# Patient Record
Sex: FEMALE | NOT HISPANIC OR LATINO | Employment: FULL TIME | URBAN - METROPOLITAN AREA
[De-identification: names, ages, dates, MRNs, and addresses within clinical notes are randomized per-mention and may not be internally consistent; named-entity substitution may affect disease eponyms.]

---

## 2022-09-22 ENCOUNTER — OFFICE VISIT (OUTPATIENT)
Dept: PODIATRY | Facility: CLINIC | Age: 47
End: 2022-09-22
Payer: COMMERCIAL

## 2022-09-22 VITALS
DIASTOLIC BLOOD PRESSURE: 76 MMHG | HEIGHT: 61 IN | BODY MASS INDEX: 27.96 KG/M2 | WEIGHT: 148.1 LBS | SYSTOLIC BLOOD PRESSURE: 118 MMHG

## 2022-09-22 DIAGNOSIS — M79.671 RIGHT FOOT PAIN: Primary | ICD-10-CM

## 2022-09-22 DIAGNOSIS — M72.2 PLANTAR FASCIITIS, RIGHT: ICD-10-CM

## 2022-09-22 PROCEDURE — 99203 OFFICE O/P NEW LOW 30 MIN: CPT | Performed by: PODIATRIST

## 2022-09-22 RX ORDER — BUPROPION HYDROCHLORIDE 100 MG/1
200 TABLET, EXTENDED RELEASE ORAL
COMMUNITY
Start: 2022-08-09

## 2022-09-22 NOTE — PATIENT INSTRUCTIONS
Thank you for choosing [facility] / Foot & Ankle Surgery!    DR [facility] CLIN[facility]:  [facility]   [address]   [place] [address]      TRIAGE LINE: [phone]  APPOINTMENTS: [phone]  RADIOLOGY: [phone]  SET UP SURGERY: [phone]  FAX NUMBER: [phone]  BILLING QUESTIONS: [phone]       Follow up: as needed.       Recommend over-the-counter super feet-green inserts.    Call below to set up casting for custom inserts.    [facility]  [facility]  [address]  [place] [address]  Phone: [phone]  Fax: [phone] [facility]  [address]  [place] [address]  Phone: [phone]   Fax: [phone]   [facility]  [address]  [place] [address]  Phone: [phone]  Fax: [phone] [facility]  [address]  [place] [address]  Phone: [phone]   Fax: [phone]   [facility]   [address]  [place] [address]  Phone: [phone]  Fax: [phone] * Please call any location listed to make an appointment for a casting/fitting. Your referral was sent to their central office and they will all have the order on file.     PLANTAR FASCIITIS  Plantar fasciitis is often referred to as heel spurs or heel pain. Plantar fasciitis is a very common problem that affects people of all foot shapes, age, weight and activity level. Pain may be in the arch or on the weight-bearing surface of the heel. The pain may come on without injury or identifiable cause. Pain is generally present when first getting out of bed in the morning or up from a seated break.     CAUSES  The plantar fascia is a dense fibrous band of tissue that stretches across the bottom surface of the foot. The fascia helps support the foot muscles and arch. Plantar fasciitis is thought to be caused by  mechanical strain or overload. Frequent walking without shoes or wearing unsupportive shoes is thought to cause structural overload and ultimately inflammation of the plantar fascia. Some people have heel spurs that can be seen on x-ray. The heel spur is actually a minor component of plantar fascitis and is largely ignored.       SELF TREATMENT   The easiest solution is to stop walking around your home without shoes. Plantar fasciitis is largely a shoe problem. Shoes are either not being worn often enough or your current shoes are inadequate for your weight, foot structure or activity level. The majority of shoes on the market today are not sufficient to resist development of plantar fasciitis or to promote healing. Assume that your current shoes are inadequate and will need to be replaced. Even high quality shoes wear out with 6 months to one year of frequent use. Weight loss is another option. Losing ten pounds in the next two months may be enough to resolve the problem. Ice applied to the area of pain two to three times per day for ten minutes each session can be very helpful. Warm foot soaks in epsom salts can also relieve pain. This should continue until the problem resolves. Achilles tendon stretching is essential. Stretch multiple times daily to promote healing and to prevent recurrence in the future. Over all stretching of the body is helpful as well such as the calves, thighs and lower back. Normally when one area of the body is tight, other areas are too. Gentle Yoga can be good for this.     Over the counter topical anti inflammatories can be helpful such as biofreeze, bengay, salon pas, ect...  Oral ibuprofen or aleve is recommended as well to try to calm down inflammation.     Night splints can be helpful to gradually stretch the foot at night as a lot of pain is when you get up in the morning. Taking a towel or thera band and stretching the foot back multiple times before you get ou of bed can be  beneficial as well.     MEDICAL TREATMENT  Medical treatments often include custom arch supports, cortisone injections, physical therapy, splints to be worn in bed, prescription medications and surgery. The home treatments listed above will be necessary regardless of these advanced medical treatments. Surgery is rarely needed but is very helpful in selected cases.     PROGNOSIS  Plantar fasciitis can last from one day to a lifetime. Some people get intermittent fascitis that is very short-lived. Others suffer daily for years. Excessive body weight, frequent bare foot walking, long hours on the feet, inadequate shoes, predisposing foot structures and excessive activity such as running are all potential issues that lead to chronic and/or recurring plantar fascitis. Having plantar fasciitis means that you are forever prone to this problem and will require modification of some of the above factors. Most people seek treatment within one to four months. Healing usually requires a similar one to four month time frame. Healing time is relative to the amount of effort spent treating the problem.   Plantar fasciitis is highly recurrent. Risk factors often continue, including return to bare foot walking, inadequate shoes, excessive body weight, excessive activities, etc. Your life style and foot structure may predispose you to recurrent plantar fasciitis. A daily prevention regimen can be very helpful. Ongoing use of shoe inserts, careful attention to appropriate shoes, daily Achilles stretching, etc. may prevent recurrence. Prompt attention at the earliest warning signs of heel pain can resolve the problem in as short as a few days.     EXERCISES  Stair Exercise: Step on the stairs with the ball of your foot and hold your position for at least 15 seconds, then slowly step down with the heels of your foot. You can do this daily and as often as you want.   Picking the Towel: Sit comfortably and then pick the towel up with your  toes. You can use any object other than a towel as long as the material can be soft and you can pick it up with your toes.  Rolling the Bottle: Use a small ball or frozen water bottle and then roll it around with your foot.   Flex the Toes: Sit comfortably and then flex your toes by pointing it towards the floor or towards your body. This will relax and flex your foot and exercise your plantar fascia, the calf, and the Achilles tendon. The inability of the foot to stretch often causes the bunching up of the plantar fascia area leading to the pain.  Calf/Achilles Stretching: Lay on you back and raise one foot, then point your toes towards the floor. See photo below:               Hold each stretch for 10 seconds. Stretch 10 times per set, three sets per day. Morning, afternoon and evening. If your heel pain is very severe in the morning, consider doing the first set of stretches before you get out of bed.      OVER THE COUNTER INSERT RECOMMENDATIONS  SuperFeet   Sofsole Fit Spenco   Power Step   Walk-Fit Arch Cradles     Most of these can be found at your local [employer], sporting [employer], or online.  **A good high quality over the counter insert should cost around $40-$50      [facility] LOCATIONS  [facility]  [address] [place] [address]  [phone]   [address]  [phone] [place]  [address] [place] [address]  [phone]   [facility]  [address] [place] [address] [address]  [phone]   [employer]  [address]  [phone] [place] [address]  [phone]   [place]  [address]  [phone]   [facility]  [address]  [phone] [place]  [address]  [phone]

## 2022-09-22 NOTE — PROGRESS NOTES
PATIENT HISTORY:  Helga Larios is a 47 year old female who presents to clinic for possible orthotics.  Patient notes that she has been having a lot of knee pain and arthritis and was told to get her feet checked.  She thinks she has flatfeet.  The pain has been going on for about a year and a half.  Sometimes her heels will get sore but if she switches her shoes she notes that it tends to help and the pain goes away.  She is wondering if inserts will help her knee pain.    Review of Systems:  Patient denies fever, chills, rash, wound, limping, numbness, weakness, heart burn, blood in stool, chest pain with activity, calf pain when walking, shortness of breath with activity, chronic cough, easy bleeding/bruising, swelling of ankles, excessive thirst, fatigue, depression, anxiety.  Patient admits to stiffness.     PAST MEDICAL HISTORY: No past medical history on file.     PAST SURGICAL HISTORY:   Past Surgical History:   Procedure Laterality Date     LAPAROSCOPIC CHOLECYSTECTOMY  5/19/2014    Procedure: LAPAROSCOPIC CHOLECYSTECTOMY;  Surgeon: Odilia Barrett DO;  Location:  OR        MEDICATIONS:   Current Outpatient Medications:      buPROPion (WELLBUTRIN SR) 100 MG 12 hr tablet, Take 200 mg by mouth, Disp: , Rfl:      estrogens-methylTESTOSTERone (ESTRATEST HS) 0.625-1.25 MG per tablet, Take 1 tablet by mouth daily, Disp: , Rfl:      loratadine (CLARITIN) 10 MG tablet, Take 10 mg by mouth daily, Disp: , Rfl:      HYDROcodone-acetaminophen (NORCO) 5-325 MG per tablet, Take 1-2 tablets by mouth every 4 hours as needed for other (Moderate to Severe Pain) (Patient not taking: Reported on 9/22/2022), Disp: 20 tablet, Rfl: 0     HYDROcodone-acetaminophen (NORCO) 5-325 MG per tablet, Take 1-2 tablets by mouth every 6 hours as needed for pain (Patient not taking: Reported on 9/22/2022), Disp: 15 tablet, Rfl: 0     ALLERGIES:  No Known Allergies     SOCIAL HISTORY:   Social History     Socioeconomic History     Marital  "status:      Spouse name: Not on file     Number of children: Not on file     Years of education: Not on file     Highest education level: Not on file   Occupational History     Not on file   Tobacco Use     Smoking status: Never Smoker     Smokeless tobacco: Never Used   Substance and Sexual Activity     Alcohol use: No     Drug use: Not on file     Sexual activity: Not on file   Other Topics Concern     Not on file   Social History Narrative     Not on file     Social Determinants of Health     Financial Resource Strain: Not on file   Food Insecurity: Not on file   Transportation Needs: Not on file   Physical Activity: Not on file   Stress: Not on file   Social Connections: Not on file   Intimate Partner Violence: Not on file   Housing Stability: Not on file        FAMILY HISTORY: No family history on file.     EXAM:Vitals: /76   Ht 1.549 m (5' 1\")   Wt 67.2 kg (148 lb 1.6 oz)   BMI 27.98 kg/m    BMI= Body mass index is 27.98 kg/m .    General appearance: Patient is alert and fully cooperative with history & exam.  No sign of distress is noted during the visit.     Psychiatric: Affect is pleasant & appropriate.  Patient appears motivated to improve health.     Respiratory: Breathing is regular & unlabored while sitting.     HEENT: Hearing is intact to spoken word.  Speech is clear.  No gross evidence of visual impairment that would impact ambulation.     Dermatologic: Skin is intact to both lower extremities without significant lesions, rash or abrasion.  No paronychia or evidence of soft tissue infection is noted.     Vascular: DP & PT pulses are intact & regular bilaterally.  No significant edema or varicosities noted.  CFT and skin temperature is normal to both lower extremities.     Neurologic: Lower extremity sensation is intact to light touch.  No evidence of weakness or contracture in the lower extremities.  No evidence of neuropathy.     Musculoskeletal: Patient is ambulatory without " assistive device or brace.  No gross ankle deformity noted.  No foot or ankle joint effusion is noted.  Currently no pain on exam.  Arch height is normal.     ASSESSMENT:    Right foot pain  Plantar fasciitis, right     Medical Decision Making/Plan:  Reviewed patient's chart in epic.  At this time we discussed that custom orthotics can sometimes be beneficial to help with shock absorption.  We will put in orders for this.  We will have her try super feet inserts in the interim until she gets her custom inserts.  Do recommend stretches and topical pain cream for when she gets her heel pain.  Discussed that there is no guarantee that the orthotics will take away her knee pain.  All questions were answered to patient's satisfaction and she will call for the questions or concerns.    Patient risk factor: Patient is at low risk for infection.        Bella Oliva DPM, Podiatry/Foot and Ankle Surgery

## 2022-09-22 NOTE — LETTER
9/22/2022         RE: Helga Larios  [address] [place] [address]        Dear Colleague,    Thank you for referring your patient, Helga Larios, to the [facility]. Please see a copy of my visit note below.    PATIENT HISTORY:  Helga Larios is a 47 year old female who presents to clinic for possible orthotics.  Patient notes that she has been having a lot of knee pain and arthritis and was told to get her feet checked.  She thinks she has flatfeet.  The pain has been going on for about a year and a half.  Sometimes her heels will get sore but if she switches her shoes she notes that it tends to help and the pain goes away.  She is wondering if inserts will help her knee pain.    Review of Systems:  Patient denies fever, chills, rash, wound, limping, numbness, weakness, heart burn, blood in stool, chest pain with activity, calf pain when walking, shortness of breath with activity, chronic cough, easy bleeding/bruising, swelling of ankles, excessive thirst, fatigue, depression, anxiety.  Patient admits to stiffness.     PAST MEDICAL HISTORY: No past medical history on file.     PAST SURGICAL HISTORY:   Past Surgical History:   Procedure Laterality Date     LAPAROSCOPIC CHOLECYSTECTOMY  5/19/2014    Procedure: LAPAROSCOPIC CHOLECYSTECTOMY;  Surgeon: Odilia Barrett DO;  Location:  OR        MEDICATIONS:   Current Outpatient Medications:      buPROPion (WELLBUTRIN SR) 100 MG 12 hr tablet, Take 200 mg by mouth, Disp: , Rfl:      estrogens-methylTESTOSTERone (ESTRATEST HS) 0.625-1.25 MG per tablet, Take 1 tablet by mouth daily, Disp: , Rfl:      loratadine (CLARITIN) 10 MG tablet, Take 10 mg by mouth daily, Disp: , Rfl:      HYDROcodone-acetaminophen (NORCO) 5-325 MG per tablet, Take 1-2 tablets by mouth every 4 hours as needed for other (Moderate to Severe Pain) (Patient not taking: Reported on 9/22/2022), Disp: 20 tablet, Rfl: 0     HYDROcodone-acetaminophen (NORCO) 5-325 MG per  "tablet, Take 1-2 tablets by mouth every 6 hours as needed for pain (Patient not taking: Reported on 9/22/2022), Disp: 15 tablet, Rfl: 0     ALLERGIES:  No Known Allergies     SOCIAL HISTORY:   Social History     Socioeconomic History     Marital status:      Spouse name: Not on file     Number of children: Not on file     Years of education: Not on file     Highest education level: Not on file   Occupational History     Not on file   Tobacco Use     Smoking status: Never Smoker     Smokeless tobacco: Never Used   Substance and Sexual Activity     Alcohol use: No     Drug use: Not on file     Sexual activity: Not on file   Other Topics Concern     Not on file   Social History Narrative     Not on file     Social Determinants of Health     Financial Resource Strain: Not on file   Food Insecurity: Not on file   Transportation Needs: Not on file   Physical Activity: Not on file   Stress: Not on file   Social Connections: Not on file   Intimate Partner Violence: Not on file   Housing Stability: Not on file        FAMILY HISTORY: No family history on file.     EXAM:Vitals: /76   Ht 1.549 m (5' 1\")   Wt 67.2 kg (148 lb 1.6 oz)   BMI 27.98 kg/m    BMI= Body mass index is 27.98 kg/m .    General appearance: Patient is alert and fully cooperative with history & exam.  No sign of distress is noted during the visit.     Psychiatric: Affect is pleasant & appropriate.  Patient appears motivated to improve health.     Respiratory: Breathing is regular & unlabored while sitting.     HEENT: Hearing is intact to spoken word.  Speech is clear.  No gross evidence of visual impairment that would impact ambulation.     Dermatologic: Skin is intact to both lower extremities without significant lesions, rash or abrasion.  No paronychia or evidence of soft tissue infection is noted.     Vascular: DP & PT pulses are intact & regular bilaterally.  No significant edema or varicosities noted.  CFT and skin temperature is normal " to both lower extremities.     Neurologic: Lower extremity sensation is intact to light touch.  No evidence of weakness or contracture in the lower extremities.  No evidence of neuropathy.     Musculoskeletal: Patient is ambulatory without assistive device or brace.  No gross ankle deformity noted.  No foot or ankle joint effusion is noted.  Currently no pain on exam.  Arch height is normal.     ASSESSMENT:    Right foot pain  Plantar fasciitis, right     Medical Decision Making/Plan:  Reviewed patient's chart in epic.  At this time we discussed that custom orthotics can sometimes be beneficial to help with shock absorption.  We will put in orders for this.  We will have her try super feet inserts in the interim until she gets her custom inserts.  Do recommend stretches and topical pain cream for when she gets her heel pain.  Discussed that there is no guarantee that the orthotics will take away her knee pain.  All questions were answered to patient's satisfaction and she will call for the questions or concerns.    Patient risk factor: Patient is at low risk for infection.        Bella Oliva DPM, Podiatry/Foot and Ankle Surgery        Again, thank you for allowing me to participate in the care of your patient.        Sincerely,        Bella Oliva DPM, Podiatry/Foot and Ankle Surgery

## 2023-05-06 ENCOUNTER — HOSPITAL ENCOUNTER (EMERGENCY)
Facility: CLINIC | Age: 48
Discharge: HOME | End: 2023-05-07
Attending: EMERGENCY MEDICINE | Admitting: EMERGENCY MEDICINE
Payer: COMMERCIAL

## 2023-05-06 DIAGNOSIS — D25.9 UTERINE LEIOMYOMA, UNSPECIFIED LOCATION: ICD-10-CM

## 2023-05-06 DIAGNOSIS — K57.32 SIGMOID DIVERTICULITIS: ICD-10-CM

## 2023-05-06 PROCEDURE — 81001 URINALYSIS AUTO W/SCOPE: CPT | Performed by: EMERGENCY MEDICINE

## 2023-05-06 PROCEDURE — 83690 ASSAY OF LIPASE: CPT | Performed by: EMERGENCY MEDICINE

## 2023-05-06 PROCEDURE — 96360 HYDRATION IV INFUSION INIT: CPT | Mod: 59

## 2023-05-06 PROCEDURE — 85025 COMPLETE CBC W/AUTO DIFF WBC: CPT | Performed by: EMERGENCY MEDICINE

## 2023-05-06 PROCEDURE — 99285 EMERGENCY DEPT VISIT HI MDM: CPT | Mod: 25

## 2023-05-06 PROCEDURE — 36415 COLL VENOUS BLD VENIPUNCTURE: CPT | Performed by: EMERGENCY MEDICINE

## 2023-05-06 PROCEDURE — 81025 URINE PREGNANCY TEST: CPT | Performed by: EMERGENCY MEDICINE

## 2023-05-06 PROCEDURE — 80053 COMPREHEN METABOLIC PANEL: CPT | Performed by: EMERGENCY MEDICINE

## 2023-05-07 ENCOUNTER — APPOINTMENT (OUTPATIENT)
Dept: CT IMAGING | Facility: CLINIC | Age: 48
End: 2023-05-07
Attending: EMERGENCY MEDICINE
Payer: COMMERCIAL

## 2023-05-07 VITALS
HEIGHT: 61 IN | BODY MASS INDEX: 26.81 KG/M2 | TEMPERATURE: 99.5 F | DIASTOLIC BLOOD PRESSURE: 74 MMHG | SYSTOLIC BLOOD PRESSURE: 118 MMHG | WEIGHT: 142 LBS | OXYGEN SATURATION: 99 % | RESPIRATION RATE: 20 BRPM | HEART RATE: 99 BPM

## 2023-05-07 LAB
ALBUMIN SERPL BCG-MCNC: 4.2 G/DL (ref 3.5–5.2)
ALBUMIN UR-MCNC: NEGATIVE MG/DL
ALP SERPL-CCNC: 80 U/L (ref 35–104)
ALT SERPL W P-5'-P-CCNC: 17 U/L (ref 10–35)
ANION GAP SERPL CALCULATED.3IONS-SCNC: 11 MMOL/L (ref 7–15)
APPEARANCE UR: ABNORMAL
AST SERPL W P-5'-P-CCNC: 20 U/L (ref 10–35)
BACTERIA #/AREA URNS HPF: ABNORMAL /HPF
BASOPHILS # BLD AUTO: 0 10E3/UL (ref 0–0.2)
BASOPHILS NFR BLD AUTO: 0 %
BILIRUB SERPL-MCNC: 1.2 MG/DL
BILIRUB UR QL STRIP: NEGATIVE
BUN SERPL-MCNC: 10.5 MG/DL (ref 6–20)
CALCIUM SERPL-MCNC: 9.3 MG/DL (ref 8.6–10)
CHLORIDE SERPL-SCNC: 103 MMOL/L (ref 98–107)
COLOR UR AUTO: ABNORMAL
CREAT SERPL-MCNC: 0.76 MG/DL (ref 0.51–0.95)
DEPRECATED HCO3 PLAS-SCNC: 23 MMOL/L (ref 22–29)
EOSINOPHIL # BLD AUTO: 0.1 10E3/UL (ref 0–0.7)
EOSINOPHIL NFR BLD AUTO: 1 %
ERYTHROCYTE [DISTWIDTH] IN BLOOD BY AUTOMATED COUNT: 11.5 % (ref 10–15)
GFR SERPL CREATININE-BSD FRML MDRD: >90 ML/MIN/1.73M2
GLUCOSE SERPL-MCNC: 107 MG/DL (ref 70–99)
GLUCOSE UR STRIP-MCNC: NEGATIVE MG/DL
HCG UR QL: NEGATIVE
HCT VFR BLD AUTO: 35.3 % (ref 35–47)
HGB BLD-MCNC: 12 G/DL (ref 11.7–15.7)
HGB UR QL STRIP: ABNORMAL
IMM GRANULOCYTES # BLD: 0.1 10E3/UL
IMM GRANULOCYTES NFR BLD: 0 %
KETONES UR STRIP-MCNC: NEGATIVE MG/DL
LEUKOCYTE ESTERASE UR QL STRIP: NEGATIVE
LIPASE SERPL-CCNC: 30 U/L (ref 13–60)
LYMPHOCYTES # BLD AUTO: 2 10E3/UL (ref 0.8–5.3)
LYMPHOCYTES NFR BLD AUTO: 15 %
MCH RBC QN AUTO: 31.7 PG (ref 26.5–33)
MCHC RBC AUTO-ENTMCNC: 34 G/DL (ref 31.5–36.5)
MCV RBC AUTO: 93 FL (ref 78–100)
MONOCYTES # BLD AUTO: 0.6 10E3/UL (ref 0–1.3)
MONOCYTES NFR BLD AUTO: 4 %
MUCOUS THREADS #/AREA URNS LPF: PRESENT /LPF
NEUTROPHILS # BLD AUTO: 10.7 10E3/UL (ref 1.6–8.3)
NEUTROPHILS NFR BLD AUTO: 80 %
NITRATE UR QL: NEGATIVE
NRBC # BLD AUTO: 0 10E3/UL
NRBC BLD AUTO-RTO: 0 /100
PH UR STRIP: 6.5 [PH] (ref 5–7)
PLATELET # BLD AUTO: 264 10E3/UL (ref 150–450)
POTASSIUM SERPL-SCNC: 4.2 MMOL/L (ref 3.4–5.3)
PROT SERPL-MCNC: 7.6 G/DL (ref 6.4–8.3)
RBC # BLD AUTO: 3.79 10E6/UL (ref 3.8–5.2)
RBC URINE: 5 /HPF
SODIUM SERPL-SCNC: 137 MMOL/L (ref 136–145)
SP GR UR STRIP: 1.02 (ref 1–1.03)
SQUAMOUS EPITHELIAL: 37 /HPF
UROBILINOGEN UR STRIP-MCNC: NORMAL MG/DL
WBC # BLD AUTO: 13.5 10E3/UL (ref 4–11)
WBC URINE: <1 /HPF

## 2023-05-07 PROCEDURE — 74177 CT ABD & PELVIS W/CONTRAST: CPT

## 2023-05-07 PROCEDURE — 258N000003 HC RX IP 258 OP 636: Performed by: EMERGENCY MEDICINE

## 2023-05-07 PROCEDURE — 250N000013 HC RX MED GY IP 250 OP 250 PS 637: Performed by: EMERGENCY MEDICINE

## 2023-05-07 PROCEDURE — 250N000011 HC RX IP 250 OP 636: Performed by: EMERGENCY MEDICINE

## 2023-05-07 PROCEDURE — 250N000009 HC RX 250: Performed by: EMERGENCY MEDICINE

## 2023-05-07 RX ORDER — CIPROFLOXACIN 500 MG/1
500 TABLET, FILM COATED ORAL ONCE
Status: COMPLETED | OUTPATIENT
Start: 2023-05-07 | End: 2023-05-07

## 2023-05-07 RX ORDER — METRONIDAZOLE 500 MG/1
500 TABLET ORAL 2 TIMES DAILY
Qty: 14 TABLET | Refills: 0 | Status: SHIPPED | OUTPATIENT
Start: 2023-05-07 | End: 2023-05-14

## 2023-05-07 RX ORDER — IOPAMIDOL 755 MG/ML
120 INJECTION, SOLUTION INTRAVASCULAR ONCE
Status: COMPLETED | OUTPATIENT
Start: 2023-05-07 | End: 2023-05-07

## 2023-05-07 RX ORDER — CIPROFLOXACIN 500 MG/1
500 TABLET, FILM COATED ORAL 2 TIMES DAILY
Qty: 14 TABLET | Refills: 0 | Status: SHIPPED | OUTPATIENT
Start: 2023-05-07 | End: 2023-05-14

## 2023-05-07 RX ORDER — SODIUM CHLORIDE 9 MG/ML
INJECTION, SOLUTION INTRAVENOUS CONTINUOUS
Status: DISCONTINUED | OUTPATIENT
Start: 2023-05-07 | End: 2023-05-07 | Stop reason: HOSPADM

## 2023-05-07 RX ORDER — METRONIDAZOLE 500 MG/1
500 TABLET ORAL ONCE
Status: COMPLETED | OUTPATIENT
Start: 2023-05-07 | End: 2023-05-07

## 2023-05-07 RX ADMIN — SODIUM CHLORIDE 1000 ML: 9 INJECTION, SOLUTION INTRAVENOUS at 00:16

## 2023-05-07 RX ADMIN — SODIUM CHLORIDE 58 ML: 9 INJECTION, SOLUTION INTRAVENOUS at 00:28

## 2023-05-07 RX ADMIN — CIPROFLOXACIN 500 MG: 500 TABLET, FILM COATED ORAL at 01:24

## 2023-05-07 RX ADMIN — IOPAMIDOL 71 ML: 755 INJECTION, SOLUTION INTRAVENOUS at 00:28

## 2023-05-07 RX ADMIN — METRONIDAZOLE 500 MG: 500 TABLET ORAL at 01:24

## 2023-05-07 ASSESSMENT — ENCOUNTER SYMPTOMS
CHILLS: 0
DYSURIA: 0
FLANK PAIN: 0
VOMITING: 0
BACK PAIN: 0
NAUSEA: 0
HEMATURIA: 0
CHEST TIGHTNESS: 0
ABDOMINAL PAIN: 1
DIARRHEA: 1
FEVER: 0

## 2023-05-07 ASSESSMENT — ACTIVITIES OF DAILY LIVING (ADL): ADLS_ACUITY_SCORE: 35

## 2023-05-07 NOTE — ED PROVIDER NOTES
History     Chief Complaint:  Abdominal Pain       HPI   Helga Larios is a 47 year old female with slowly progressing over last days feelings of gas and intermittent diarrhea.  No dysuria.  Lower bilateral abdominal pain sometimes favoring left vs right.  Tried imodium and no further diarrhea.  Had Gb removed but still appendix and pelvic organs.  No NV.  No CP SOB.      Independent Historian:    Spouse/partner is an ER doctor    Review of External Notes:      ROS:  Review of Systems   Constitutional: Negative for chills and fever.   Respiratory: Negative for chest tightness.    Cardiovascular: Negative for chest pain.   Gastrointestinal: Positive for abdominal pain and diarrhea. Negative for nausea and vomiting.   Genitourinary: Negative for dysuria, flank pain, hematuria, vaginal bleeding and vaginal discharge.   Musculoskeletal: Negative for back pain.   All other systems reviewed and are negative.      Allergies:  No Known Allergies     Medications:    ciprofloxacin (CIPRO) 500 MG tablet  metroNIDAZOLE (FLAGYL) 500 MG tablet  buPROPion (WELLBUTRIN SR) 100 MG 12 hr tablet  estrogens-methylTESTOSTERone (ESTRATEST HS) 0.625-1.25 MG per tablet  HYDROcodone-acetaminophen (NORCO) 5-325 MG per tablet  HYDROcodone-acetaminophen (NORCO) 5-325 MG per tablet  loratadine (CLARITIN) 10 MG tablet        Past Medical History:    No past medical history on file.    Past Surgical History:    Past Surgical History:   Procedure Laterality Date     LAPAROSCOPIC CHOLECYSTECTOMY  5/19/2014    Procedure: LAPAROSCOPIC CHOLECYSTECTOMY;  Surgeon: Odilia Barrett DO;  Location:  OR        Family History:    family history is not on file.    Social History:   reports that she has never smoked. She has never used smokeless tobacco. She reports that she does not drink alcohol.  PCP: Jimena Gray     Physical Exam     Patient Vitals for the past 24 hrs:   BP Temp Temp src Pulse Resp SpO2 Height Weight   05/07/23 0009 -- -- --  "-- -- 99 % -- --   05/06/23 2359 118/76 -- -- 99 -- -- -- --   05/06/23 2338 113/73 99.5  F (37.5  C) Temporal 99 20 99 % 1.549 m (5' 1\") 64.4 kg (142 lb)        Physical Exam  Constitutional: Patient is well appearing. No distress.  Head: Atraumatic.  Eyes: Conjunctivae are normal. No scleral icterus.   Cardiovascular: Normal rate, regular rhythm, normal heart sounds and intact distal perfusion.   Pulmonary/Chest: Breath sounds normal. No respiratory distress.  Abdominal: Soft. Bowel sounds are normal. No distension. No tenderness. No rebound or guarding.   However  describes some guarding at home and patients states any bumps in road caused pain on drive here.  Musculoskeletal: Normal range of motion. No edema or tenderness.   Neurological: Alert and orientated to person, place, and time. No observable focal neuro deficit  Skin: Warm and dry. No rash noted. Not diaphoretic.     Emergency Department Course       Imaging:  CT Abdomen Pelvis w Contrast   Final Result   IMPRESSION:    1.  Sigmoid colonic diverticulitis, with pericolonic stranding, no evidence of pericolonic abscess seen at this time. Underlying colonic mass cannot be completely excluded and further evaluation may be warranted after acute symptoms have resolved.   2.  Globular myomatous uterus with prominent left adnexal vessels, which may reflect underlying pelvic congestion syndrome, correlation with patient's clinical symptomatology is recommended.        Report per radiology    Laboratory:  Labs Ordered and Resulted from Time of ED Arrival to Time of ED Departure   COMPREHENSIVE METABOLIC PANEL - Abnormal       Result Value    Sodium 137      Potassium 4.2      Chloride 103      Carbon Dioxide (CO2) 23      Anion Gap 11      Urea Nitrogen 10.5      Creatinine 0.76      Calcium 9.3      Glucose 107 (*)     Alkaline Phosphatase 80      AST 20      ALT 17      Protein Total 7.6      Albumin 4.2      Bilirubin Total 1.2      GFR Estimate >90   "   ROUTINE UA WITH MICROSCOPIC REFLEX TO CULTURE - Abnormal    Color Urine Light Yellow      Appearance Urine Slightly Cloudy (*)     Glucose Urine Negative      Bilirubin Urine Negative      Ketones Urine Negative      Specific Gravity Urine 1.016      Blood Urine Small (*)     pH Urine 6.5      Protein Albumin Urine Negative      Urobilinogen Urine Normal      Nitrite Urine Negative      Leukocyte Esterase Urine Negative      Bacteria Urine Few (*)     Mucus Urine Present (*)     RBC Urine 5 (*)     WBC Urine <1      Squamous Epithelials Urine 37 (*)    CBC WITH PLATELETS AND DIFFERENTIAL - Abnormal    WBC Count 13.5 (*)     RBC Count 3.79 (*)     Hemoglobin 12.0      Hematocrit 35.3      MCV 93      MCH 31.7      MCHC 34.0      RDW 11.5      Platelet Count 264      % Neutrophils 80      % Lymphocytes 15      % Monocytes 4      % Eosinophils 1      % Basophils 0      % Immature Granulocytes 0      NRBCs per 100 WBC 0      Absolute Neutrophils 10.7 (*)     Absolute Lymphocytes 2.0      Absolute Monocytes 0.6      Absolute Eosinophils 0.1      Absolute Basophils 0.0      Absolute Immature Granulocytes 0.1      Absolute NRBCs 0.0     LIPASE - Normal    Lipase 30     HCG QUALITATIVE URINE - Normal    hCG Urine Qualitative Negative          Procedures       Emergency Department Course & Assessments:             Interventions:  Medications   0.9% sodium chloride BOLUS (0 mLs Intravenous Stopped 5/7/23 0101)     Followed by   sodium chloride 0.9% infusion ( Intravenous Not Given 5/7/23 0102)   ciprofloxacin (CIPRO) tablet 500 mg (has no administration in time range)   metroNIDAZOLE (FLAGYL) tablet 500 mg (has no administration in time range)   iopamidol (ISOVUE-370) solution 120 mL (71 mLs Intravenous $Given 5/7/23 0028)   Saline CT scan flush (58 mLs Intravenous $Given 5/7/23 0028)        Independent Interpretation (X-rays, CTs, rhythm strip):   Appear uncomplicated sigmoid diverticulitis    Consultations/Discussion of  Management or Tests:          Social Determinants of Health affecting care:         Assessments:      Disposition:  The patient was discharged to home.     Impression & Plan        Medical Decision Making:  Helga Larios is a 47 year old female who presents for evaluation of abdominal pain.  A broad differential diagnosis was considered and CT confirms diverticulitis without abscess or perforation; this appears consistent with the history and physical exam so I doubt another underlying etiology is also present.  The patient's pain has been controlled by interventions in the emergency department.  This represents uncomplicated diverticulitis at this time.  There are no signs of sepsis, shock or bacteremia.  The natural history of this problem was discussed, and I educated the patient regarding the symptoms and signs that should prompt return to the Emergency Department.  This would include worsening fevers, chills, vomiting, and more intense pain.  The patient is to take antibiotics and pain medications as directed.    Follow-up with primary care physician is indicated as is discussion on uterine myomatous.      Diagnosis:    ICD-10-CM    1. Uterine leiomyoma, unspecified location  D25.9       2. Sigmoid diverticulitis  K57.32            Discharge Medications:  New Prescriptions    CIPROFLOXACIN (CIPRO) 500 MG TABLET    Take 1 tablet (500 mg) by mouth 2 times daily for 7 days    METRONIDAZOLE (FLAGYL) 500 MG TABLET    Take 1 tablet (500 mg) by mouth 2 times daily for 7 days              5/7/2023   Armin Dumont MD Stevens, Andrew C, MD  05/07/23 0114

## 2023-07-23 ENCOUNTER — HEALTH MAINTENANCE LETTER (OUTPATIENT)
Age: 48
End: 2023-07-23

## 2023-12-10 ENCOUNTER — HEALTH MAINTENANCE LETTER (OUTPATIENT)
Age: 48
End: 2023-12-10

## 2023-12-31 ENCOUNTER — HOSPITAL ENCOUNTER (EMERGENCY)
Facility: CLINIC | Age: 48
Discharge: LEFT BEFORE DISCHARGE | End: 2023-12-31
Admitting: EMERGENCY MEDICINE
Payer: COMMERCIAL

## 2023-12-31 VITALS
RESPIRATION RATE: 20 BRPM | DIASTOLIC BLOOD PRESSURE: 71 MMHG | TEMPERATURE: 97.2 F | HEART RATE: 100 BPM | OXYGEN SATURATION: 96 % | SYSTOLIC BLOOD PRESSURE: 128 MMHG

## 2023-12-31 PROCEDURE — 99281 EMR DPT VST MAYX REQ PHY/QHP: CPT

## 2023-12-31 NOTE — ED TRIAGE NOTES
Patient has had 2 days on left lower abdominal pain- patient had this pain back in May- dx with diverticulitis. Patient states laying down is the only thing to help the pain. Has not been able to eat/drink. Vomited last night and some diarrhea. No fevers.

## 2024-01-01 ENCOUNTER — APPOINTMENT (OUTPATIENT)
Dept: CT IMAGING | Facility: CLINIC | Age: 49
End: 2024-01-01
Attending: EMERGENCY MEDICINE
Payer: COMMERCIAL

## 2024-01-01 ENCOUNTER — HOSPITAL ENCOUNTER (EMERGENCY)
Facility: CLINIC | Age: 49
Discharge: HOME | End: 2024-01-01
Attending: EMERGENCY MEDICINE | Admitting: EMERGENCY MEDICINE
Payer: COMMERCIAL

## 2024-01-01 VITALS
DIASTOLIC BLOOD PRESSURE: 72 MMHG | OXYGEN SATURATION: 100 % | HEART RATE: 102 BPM | SYSTOLIC BLOOD PRESSURE: 127 MMHG | RESPIRATION RATE: 18 BRPM | TEMPERATURE: 96.7 F

## 2024-01-01 DIAGNOSIS — K57.92 DIVERTICULITIS: ICD-10-CM

## 2024-01-01 LAB
ALBUMIN SERPL BCG-MCNC: 4.3 G/DL (ref 3.5–5.2)
ALBUMIN UR-MCNC: 20 MG/DL
ALP SERPL-CCNC: 63 U/L (ref 40–150)
ALT SERPL W P-5'-P-CCNC: 23 U/L (ref 0–50)
ANION GAP SERPL CALCULATED.3IONS-SCNC: 11 MMOL/L (ref 7–15)
APPEARANCE UR: ABNORMAL
AST SERPL W P-5'-P-CCNC: 23 U/L (ref 0–45)
BASOPHILS # BLD AUTO: 0 10E3/UL (ref 0–0.2)
BASOPHILS NFR BLD AUTO: 0 %
BILIRUB SERPL-MCNC: 1.7 MG/DL
BILIRUB UR QL STRIP: NEGATIVE
BUN SERPL-MCNC: 11.1 MG/DL (ref 6–20)
CALCIUM SERPL-MCNC: 8.9 MG/DL (ref 8.6–10)
CHLORIDE SERPL-SCNC: 104 MMOL/L (ref 98–107)
COLOR UR AUTO: YELLOW
CREAT SERPL-MCNC: 0.87 MG/DL (ref 0.51–0.95)
DEPRECATED HCO3 PLAS-SCNC: 23 MMOL/L (ref 22–29)
EGFRCR SERPLBLD CKD-EPI 2021: 81 ML/MIN/1.73M2
EOSINOPHIL # BLD AUTO: 0.1 10E3/UL (ref 0–0.7)
EOSINOPHIL NFR BLD AUTO: 2 %
ERYTHROCYTE [DISTWIDTH] IN BLOOD BY AUTOMATED COUNT: 11.6 % (ref 10–15)
GLUCOSE SERPL-MCNC: 110 MG/DL (ref 70–99)
GLUCOSE UR STRIP-MCNC: NEGATIVE MG/DL
HCT VFR BLD AUTO: 34.6 % (ref 35–47)
HGB BLD-MCNC: 11.9 G/DL (ref 11.7–15.7)
HGB UR QL STRIP: ABNORMAL
IMM GRANULOCYTES # BLD: 0 10E3/UL
IMM GRANULOCYTES NFR BLD: 0 %
KETONES UR STRIP-MCNC: NEGATIVE MG/DL
LEUKOCYTE ESTERASE UR QL STRIP: NEGATIVE
LIPASE SERPL-CCNC: 24 U/L (ref 13–60)
LYMPHOCYTES # BLD AUTO: 2.2 10E3/UL (ref 0.8–5.3)
LYMPHOCYTES NFR BLD AUTO: 24 %
MCH RBC QN AUTO: 32.1 PG (ref 26.5–33)
MCHC RBC AUTO-ENTMCNC: 34.4 G/DL (ref 31.5–36.5)
MCV RBC AUTO: 93 FL (ref 78–100)
MONOCYTES # BLD AUTO: 0.5 10E3/UL (ref 0–1.3)
MONOCYTES NFR BLD AUTO: 6 %
MUCOUS THREADS #/AREA URNS LPF: PRESENT /LPF
NEUTROPHILS # BLD AUTO: 6.1 10E3/UL (ref 1.6–8.3)
NEUTROPHILS NFR BLD AUTO: 68 %
NITRATE UR QL: NEGATIVE
NRBC # BLD AUTO: 0 10E3/UL
NRBC BLD AUTO-RTO: 0 /100
PH UR STRIP: 5.5 [PH] (ref 5–7)
PLATELET # BLD AUTO: 247 10E3/UL (ref 150–450)
POTASSIUM SERPL-SCNC: 3.6 MMOL/L (ref 3.4–5.3)
PROT SERPL-MCNC: 7.6 G/DL (ref 6.4–8.3)
RBC # BLD AUTO: 3.71 10E6/UL (ref 3.8–5.2)
RBC URINE: 5 /HPF
SODIUM SERPL-SCNC: 138 MMOL/L (ref 135–145)
SP GR UR STRIP: 1.02 (ref 1–1.03)
SQUAMOUS EPITHELIAL: 30 /HPF
UROBILINOGEN UR STRIP-MCNC: NORMAL MG/DL
WBC # BLD AUTO: 9 10E3/UL (ref 4–11)
WBC URINE: 1 /HPF

## 2024-01-01 PROCEDURE — 96361 HYDRATE IV INFUSION ADD-ON: CPT

## 2024-01-01 PROCEDURE — 99285 EMERGENCY DEPT VISIT HI MDM: CPT | Mod: 25

## 2024-01-01 PROCEDURE — 258N000003 HC RX IP 258 OP 636: Performed by: EMERGENCY MEDICINE

## 2024-01-01 PROCEDURE — 250N000009 HC RX 250: Performed by: EMERGENCY MEDICINE

## 2024-01-01 PROCEDURE — 74177 CT ABD & PELVIS W/CONTRAST: CPT

## 2024-01-01 PROCEDURE — 250N000011 HC RX IP 250 OP 636: Performed by: EMERGENCY MEDICINE

## 2024-01-01 PROCEDURE — 80053 COMPREHEN METABOLIC PANEL: CPT | Performed by: EMERGENCY MEDICINE

## 2024-01-01 PROCEDURE — 85025 COMPLETE CBC W/AUTO DIFF WBC: CPT | Performed by: EMERGENCY MEDICINE

## 2024-01-01 PROCEDURE — 96374 THER/PROPH/DIAG INJ IV PUSH: CPT | Mod: 59

## 2024-01-01 PROCEDURE — 36415 COLL VENOUS BLD VENIPUNCTURE: CPT | Performed by: EMERGENCY MEDICINE

## 2024-01-01 PROCEDURE — 83690 ASSAY OF LIPASE: CPT | Performed by: EMERGENCY MEDICINE

## 2024-01-01 PROCEDURE — 81001 URINALYSIS AUTO W/SCOPE: CPT | Performed by: EMERGENCY MEDICINE

## 2024-01-01 RX ORDER — OXYCODONE HYDROCHLORIDE 5 MG/1
2.5-5 TABLET ORAL EVERY 6 HOURS PRN
Qty: 12 TABLET | Refills: 0 | Status: SHIPPED | OUTPATIENT
Start: 2024-01-01 | End: 2024-01-04

## 2024-01-01 RX ORDER — IOPAMIDOL 755 MG/ML
500 INJECTION, SOLUTION INTRAVASCULAR ONCE
Status: COMPLETED | OUTPATIENT
Start: 2024-01-01 | End: 2024-01-01

## 2024-01-01 RX ORDER — KETOROLAC TROMETHAMINE 15 MG/ML
15 INJECTION, SOLUTION INTRAMUSCULAR; INTRAVENOUS ONCE
Status: COMPLETED | OUTPATIENT
Start: 2024-01-01 | End: 2024-01-01

## 2024-01-01 RX ADMIN — SODIUM CHLORIDE 58 ML: 9 INJECTION, SOLUTION INTRAVENOUS at 09:44

## 2024-01-01 RX ADMIN — IOPAMIDOL 71 ML: 755 INJECTION, SOLUTION INTRAVENOUS at 09:43

## 2024-01-01 RX ADMIN — SODIUM CHLORIDE 1000 ML: 9 INJECTION, SOLUTION INTRAVENOUS at 09:31

## 2024-01-01 RX ADMIN — KETOROLAC TROMETHAMINE 15 MG: 15 INJECTION, SOLUTION INTRAMUSCULAR; INTRAVENOUS at 09:33

## 2024-01-01 ASSESSMENT — ACTIVITIES OF DAILY LIVING (ADL): ADLS_ACUITY_SCORE: 35

## 2024-01-01 NOTE — DISCHARGE INSTRUCTIONS
Discharge Instructions  Diverticulitis  Your provider has diagnosed you with diverticulitis.  Diverticulitis is an infection of a diverticulum, which is a tiny sack-like structure that protrudes off the wall of the colon (large intestine).  These sacks are created over years of increased pressure in the colon (this is diverticulosis), usually as a result of a diet without enough fruits, vegetables, and whole grains. Because these sacks are small, bacteria can get trapped inside them and cause an infection (this is divericulitis).  This infection often causes abdominal (belly) pain, fever, nausea (sick to stomach), and vomiting (throwing up). Diverticulitis is usually treated at home.  However, sometimes diverticulitis needs treatment in the hospital and may even need surgery.    Generally, every Emergency Department visit should have a follow-up clinic visit with either a primary or a specialty clinic/provider. Please follow-up as instructed by your emergency provider today.    Return to the Emergency Department if:   You get an oral temperature above 102oF or as directed by your provider.  You have blood in your stools (bright red or black, tarry stools), or have blood in your vomit.  You keep vomiting or cannot drink liquids or cannot keep your medicine down.  You cannot have a bowel movement or you cannot pass gas.  Your stomach gets bloated or bigger.  You faint or become very weak.  Your pain is too bad to tolerate.  You have new symptoms or anything that worries you.    What can I do to help myself?  Fill any antibiotic prescriptions the provider gave you and take them right away. Be sure to finish the whole antibiotic prescription.  For the first day or two at home drink only clear liquids.  This lets your intestines rest.  If your pain has improved after one or two days, you may start eating mild foods. Soda crackers, toast, plain noodles, gelatin, applesauce and bananas are good first choices.  Avoid foods  "that have acid, are spicy, fatty or fibrous (such as meats, coarse grains, vegetables). You may start eating these foods again in about 3-4 days when you are better.  Once you are back to normal, eat a high fiber diet of fruits, vegetables and whole grains. Some people think you should avoid eating nuts, seeds, and corn, but there is no definite proof this makes any difference in whether you will get diverticulitis again.   Pain and fever can be treated with Tylenol  (acetaminophen) or you might have been prescribed a pain medication.    Probiotics: If you have been given an antibiotic, you may want to also take a probiotic pill or eat yogurt with live cultures. Probiotics have \"good bacteria\" to help your intestines stay healthy. Studies have shown that probiotics help prevent diarrhea and other intestine problems (including C. diff infection) when you take antibiotics. You can buy these without a prescription in the pharmacy section of the store.  If you were given a prescription for medicine here today, be sure to read all of the information (including the package insert) that comes with your prescription.  This will include important information about the medicine, its side effects, and any warnings that you need to know about.  The pharmacist who fills the prescription can provide more information and answer questions you may have about the medicine.  If you have questions or concerns that the pharmacist cannot address, please call or return to the Emergency Department.     Remember that you can always come back to the Emergency Department if you are not able to see your regular provider in the amount of time listed above, if you get any new symptoms, or if there is anything that worries you.    Opioid Medication Information    You have been given a prescription for an opioid (narcotic) pain medicine and/or have received a pain medicine while here in the Emergency Department. These medicines can make you drowsy " or impaired. You must not drive, operate dangerous equipment, or engage in any other dangerous activities while taking these medications. If you drive while taking these medications, you could be arrested for driving under the influence (DUI). Do not drink any alcohol while you are taking these medications.     Opioid pain medications can cause addiction. If you have a history of chemical dependency of any type, you are at a higher risk of becoming addicted to pain medications.  Only take these prescribed medications to treat your pain when all other options have been tried. Take it for as short a time and as few doses as possible. Store your pain pills in a secure place, as they are frequently stolen and provide a dangerous opportunity for children or visitors in your house to start abusing these powerful medications. We will not replace any lost or stolen medicine.    If you do not finish your medication, it is a good idea to get rid of it but please do not flush it down the toilet. Please dispose of the remaining medication at a local pharmacy or law enforcement facility. The [place] [employer] has additional information on medication disposal: [email or web address]-medications.      Many prescription pain medications contain Tylenol  (acetaminophen), including Vicodin , Tylenol #3 , Norco , Lortab , and Percocet .  You should not take any extra pills of Tylenol  if you are using these prescription medications or you can get very sick.  Do not ever take more than 3000 mg of acetaminophen in any 24 hour period.    All opioids tend to cause constipation. Drink plenty of water and eat foods that have a lot of fiber, such as fruits, vegetables, prune juice, apple juice and high fiber cereal.  Take a laxative if you don t move your bowels at least every other day. Miralax , Milk of Magnesia, Colace , or Senna  can be used to keep you regular.

## 2024-01-01 NOTE — ED TRIAGE NOTES
Hx diverticulitis. Pt reports 3 days diarrhea, abdominal pain, bloating , emesis x 1, and nausea. Afebrile. Pt reports sx similar to past episode of abdominal pain. ABC in tact. A/OX4     Triage Assessment (Adult)       Row Name 01/01/24 0906          Triage Assessment    Airway WDL WDL        Respiratory WDL    Respiratory WDL WDL        Skin Circulation/Temperature WDL    Skin Circulation/Temperature WDL WDL        Cardiac WDL    Cardiac WDL WDL        Peripheral/Neurovascular WDL    Peripheral Neurovascular WDL WDL        Cognitive/Neuro/Behavioral WDL    Cognitive/Neuro/Behavioral WDL WDL

## 2024-01-01 NOTE — ED PROVIDER NOTES
History     Chief Complaint:  Abdominal Pain     HPI   Helga Larios is a 48 year old female with history of diverticulitis, uterine leiomyoma, and cholecystectomy who presents to the ED for evaluation of abdominal pain. The patient reports she developed abdominal bloating and lower abdominal pain three days ago. She has not been eating due to loss of appetite and endorses diarrhea, nausea, vomiting. She has used stool softeners without improvement. No dysuria or fevers. Her pain is similar to previous episode of diverticulitis. Last oral intake this morning.    Independent Historian:   None - Patient Only    Review of External Notes:   Emergency Department note dated 5/16/2023 where she was seen for abdominal pain found to have uncomplicated diverticulitis.    Medications:    buPROPion (WELLBUTRIN SR) 100 MG 12 hr tablet  estrogens-methylTESTOSTERone (ESTRATEST HS) 0.625-1.25 MG per tablet  HYDROcodone-acetaminophen (NORCO) 5-325 MG per tablet  HYDROcodone-acetaminophen (NORCO) 5-325 MG per tablet  loratadine (CLARITIN) 10 MG tablet    Past Medical History:    No past medical history on file.    Past Surgical History:    Past Surgical History:   Procedure Laterality Date    LAPAROSCOPIC CHOLECYSTECTOMY  5/19/2014    Procedure: LAPAROSCOPIC CHOLECYSTECTOMY;  Surgeon: Odilia Barrett DO;  Location:  OR        Physical Exam   Patient Vitals for the past 24 hrs:   BP Temp Temp src Pulse Resp SpO2   01/01/24 0906 135/89 (!) 96.7  F (35.9  C) Temporal 99 18 98 %        Physical Exam  Constitutional: Well appearing.  HEENT: Atraumatic.  Moist mucous membranes.  Neck: Soft.  Supple.    Cardiac: Regular rate and rhythm.  No murmur or rub.  Respiratory: Clear to auscultation bilaterally.  No respiratory distress.  No wheezing, rhonchi, or rales.  Abdomen: Soft with mild mid to left lower quadrant tenderness to palpation.  No guarding.  Nondistended.  Musculoskeletal: No edema.  Normal range of motion.  Neurologic: Alert  and oriented.  Normal tone and bulk.    Skin: No rashes.  No edema.  Psych: Normal affect.  Normal behavior.          Emergency Department Course     Imaging:  CT Abdomen Pelvis w Contrast   Final Result   IMPRESSION:    1.  Findings suspicious for acute sigmoid diverticulitis. No abscess or free air. Colonoscopy correlation should be considered after acute events have resolved to exclude an underlying mass.         Laboratory:  Labs Ordered and Resulted from Time of ED Arrival to Time of ED Departure   COMPREHENSIVE METABOLIC PANEL - Abnormal       Result Value    Sodium 138      Potassium 3.6      Carbon Dioxide (CO2) 23      Anion Gap 11      Urea Nitrogen 11.1      Creatinine 0.87      GFR Estimate 81      Calcium 8.9      Chloride 104      Glucose 110 (*)     Alkaline Phosphatase 63      AST 23      ALT 23      Protein Total 7.6      Albumin 4.3      Bilirubin Total 1.7 (*)    ROUTINE UA WITH MICROSCOPIC REFLEX TO CULTURE - Abnormal    Color Urine Yellow      Appearance Urine Slightly Cloudy (*)     Glucose Urine Negative      Bilirubin Urine Negative      Ketones Urine Negative      Specific Gravity Urine 1.024      Blood Urine Moderate (*)     pH Urine 5.5      Protein Albumin Urine 20 (*)     Urobilinogen Urine Normal      Nitrite Urine Negative      Leukocyte Esterase Urine Negative      Mucus Urine Present (*)     RBC Urine 5 (*)     WBC Urine 1      Squamous Epithelials Urine 30 (*)    CBC WITH PLATELETS AND DIFFERENTIAL - Abnormal    WBC Count 9.0      RBC Count 3.71 (*)     Hemoglobin 11.9      Hematocrit 34.6 (*)     MCV 93      MCH 32.1      MCHC 34.4      RDW 11.6      Platelet Count 247      % Neutrophils 68      % Lymphocytes 24      % Monocytes 6      % Eosinophils 2      % Basophils 0      % Immature Granulocytes 0      NRBCs per 100 WBC 0      Absolute Neutrophils 6.1      Absolute Lymphocytes 2.2      Absolute Monocytes 0.5      Absolute Eosinophils 0.1      Absolute Basophils 0.0      Absolute  Immature Granulocytes 0.0      Absolute NRBCs 0.0     LIPASE - Normal    Lipase 24          Emergency Department Course & Assessments:     Interventions:  Medications   sodium chloride 0.9% BOLUS 1,000 mL (0 mLs Intravenous Stopped 1/1/24 1142)   ketorolac (TORADOL) injection 15 mg (15 mg Intravenous $Given 1/1/24 0933)   iopamidol (ISOVUE-370) solution 500 mL (71 mLs Intravenous $Given 1/1/24 0943)   CT scan flush use (58 mLs As instructed $Given 1/1/24 0944)      Assessments:  0923 Initial Examination    Independent Interpretation (X-rays, CTs, rhythm strip):  None    Consultations/Discussion of Management or Tests:  None        Social Determinants of Health affecting care:   None    Disposition:  The patient was discharged to home.     Impression & Plan      Medical Decision Making:  Helga Larios is a 48 year old female who is afebrile and hemodynamically stable.  Abdomen is soft and benign without acute peritoneal signs but is having left lower quadrant tenderness to palpation.  Lab workup as noted as above and is grossly unrevealing.  CT scan of the abdomen and pelvis demonstrated uncomplicated diverticulitis.  Discussed results with her.  She feels comfortable discharging home.  Appropriate for outpatient treatment.  Discussed plan for home with Augmentin.  She declined anything further for pain here but is interested in pain medication for home.  I did prescribe a small amount of opiate pain medication for home if needed and printed out a prescription and she will decide if she wants to take it or not.  She was counseled on the use of opiate pain medication.  Discussed supportive care at home and very strict return precautions were given.  She did have a follow-up colonoscopy after her last bout of diverticulitis is reassuring but I recommended close primary care follow-up.  Her questions were answered and she was given strict return precautions.  She was in no distress at time of discharge.    Diagnosis:     ICD-10-CM    1. Diverticulitis  K57.92            Discharge Medications:  Discharge Medication List as of 1/1/2024 11:43 AM        START taking these medications    Details   amoxicillin-clavulanate (AUGMENTIN) 875-125 MG tablet Take 1 tablet by mouth 2 times daily for 10 days, Disp-20 tablet, R-0, E-Prescribe      oxyCODONE (ROXICODONE) 5 MG tablet Take 0.5-1 tablets (2.5-5 mg) by mouth every 6 hours as needed for pain, Disp-12 tablet, R-0, Local Print            Scribe Disclosure:  I, Melecio Lnych, am serving as a scribe at 9:22 AM on 1/1/2024 to document services personally performed by Jarad Kilgore MD based on my observations and the provider's statements to me.     1/1/2024   Jarad Kilgore MD Salay, Nicholas J, MD  01/01/24 1453

## 2024-11-10 ENCOUNTER — HOSPITAL ENCOUNTER (EMERGENCY)
Facility: CLINIC | Age: 49
Discharge: HOME | End: 2024-11-10
Attending: PHYSICIAN ASSISTANT | Admitting: PHYSICIAN ASSISTANT
Payer: COMMERCIAL

## 2024-11-10 VITALS
RESPIRATION RATE: 20 BRPM | OXYGEN SATURATION: 100 % | DIASTOLIC BLOOD PRESSURE: 98 MMHG | SYSTOLIC BLOOD PRESSURE: 190 MMHG | HEART RATE: 95 BPM | BODY MASS INDEX: 26.68 KG/M2 | TEMPERATURE: 97.3 F | HEIGHT: 62 IN | WEIGHT: 145 LBS

## 2024-11-10 DIAGNOSIS — M54.42 ACUTE LEFT-SIDED LOW BACK PAIN WITH LEFT-SIDED SCIATICA: ICD-10-CM

## 2024-11-10 PROCEDURE — 250N000012 HC RX MED GY IP 250 OP 636 PS 637: Performed by: PHYSICIAN ASSISTANT

## 2024-11-10 PROCEDURE — 99284 EMERGENCY DEPT VISIT MOD MDM: CPT | Mod: 25

## 2024-11-10 PROCEDURE — 250N000011 HC RX IP 250 OP 636: Performed by: PHYSICIAN ASSISTANT

## 2024-11-10 PROCEDURE — 96374 THER/PROPH/DIAG INJ IV PUSH: CPT

## 2024-11-10 RX ORDER — OXYCODONE HYDROCHLORIDE 5 MG/1
5 TABLET ORAL EVERY 6 HOURS PRN
Qty: 8 TABLET | Refills: 0 | Status: SHIPPED | OUTPATIENT
Start: 2024-11-10

## 2024-11-10 RX ORDER — ONDANSETRON 4 MG/1
4 TABLET, ORALLY DISINTEGRATING ORAL ONCE
Status: COMPLETED | OUTPATIENT
Start: 2024-11-10 | End: 2024-11-10

## 2024-11-10 RX ORDER — PREDNISONE 20 MG/1
40 TABLET ORAL ONCE
Status: COMPLETED | OUTPATIENT
Start: 2024-11-10 | End: 2024-11-10

## 2024-11-10 RX ORDER — OXYCODONE HYDROCHLORIDE 5 MG/1
5 TABLET ORAL EVERY 6 HOURS PRN
Qty: 8 TABLET | Refills: 0 | Status: SHIPPED | OUTPATIENT
Start: 2024-11-10 | End: 2024-11-10

## 2024-11-10 RX ORDER — ONDANSETRON 2 MG/ML
4 INJECTION INTRAMUSCULAR; INTRAVENOUS ONCE
Status: DISCONTINUED | OUTPATIENT
Start: 2024-11-10 | End: 2024-11-10 | Stop reason: ALTCHOICE

## 2024-11-10 RX ORDER — PREDNISONE 20 MG/1
40 TABLET ORAL DAILY
Qty: 8 TABLET | Refills: 0 | Status: SHIPPED | OUTPATIENT
Start: 2024-11-11 | End: 2024-11-10

## 2024-11-10 RX ORDER — HYDROMORPHONE HYDROCHLORIDE 1 MG/ML
0.5 INJECTION, SOLUTION INTRAMUSCULAR; INTRAVENOUS; SUBCUTANEOUS
Status: COMPLETED | OUTPATIENT
Start: 2024-11-10 | End: 2024-11-10

## 2024-11-10 RX ORDER — ONDANSETRON 4 MG/1
4 TABLET, ORALLY DISINTEGRATING ORAL EVERY 8 HOURS PRN
Qty: 10 TABLET | Refills: 0 | Status: SHIPPED | OUTPATIENT
Start: 2024-11-10

## 2024-11-10 RX ORDER — ONDANSETRON 4 MG/1
4 TABLET, ORALLY DISINTEGRATING ORAL EVERY 8 HOURS PRN
Qty: 10 TABLET | Refills: 0 | Status: SHIPPED | OUTPATIENT
Start: 2024-11-10 | End: 2024-11-10

## 2024-11-10 RX ORDER — PREDNISONE 20 MG/1
40 TABLET ORAL DAILY
Qty: 8 TABLET | Refills: 0 | Status: SHIPPED | OUTPATIENT
Start: 2024-11-11 | End: 2024-11-15

## 2024-11-10 RX ADMIN — HYDROMORPHONE HYDROCHLORIDE 0.5 MG: 1 INJECTION, SOLUTION INTRAMUSCULAR; INTRAVENOUS; SUBCUTANEOUS at 16:38

## 2024-11-10 RX ADMIN — PREDNISONE 40 MG: 20 TABLET ORAL at 16:38

## 2024-11-10 RX ADMIN — ONDANSETRON 4 MG: 4 TABLET, ORALLY DISINTEGRATING ORAL at 17:44

## 2024-11-10 RX ADMIN — ONDANSETRON 4 MG: 4 TABLET, ORALLY DISINTEGRATING ORAL at 17:59

## 2024-11-10 ASSESSMENT — ACTIVITIES OF DAILY LIVING (ADL): ADLS_ACUITY_SCORE: 0

## 2024-11-10 ASSESSMENT — COLUMBIA-SUICIDE SEVERITY RATING SCALE - C-SSRS
1. IN THE PAST MONTH, HAVE YOU WISHED YOU WERE DEAD OR WISHED YOU COULD GO TO SLEEP AND NOT WAKE UP?: NO
2. HAVE YOU ACTUALLY HAD ANY THOUGHTS OF KILLING YOURSELF IN THE PAST MONTH?: NO
6. HAVE YOU EVER DONE ANYTHING, STARTED TO DO ANYTHING, OR PREPARED TO DO ANYTHING TO END YOUR LIFE?: NO

## 2024-11-10 NOTE — ED TRIAGE NOTES
Left lower back pain radiating into left leg for 2 weeks. Came here from [place] last night and pain worse after traveling. Took  hydrocodone at 1500 - not helping. Gabapentin taken at 1200. 600mg ibuprofen at 1000.

## 2024-11-10 NOTE — DISCHARGE INSTRUCTIONS
Use Tylenol 1000mg every 8 hours for pain. If you can take ibuprofen, use this 400mg every 6-8 hours.  Use oxycodone for breakthrough pain. This is sedating. Do not drive after taking.  Use Zofran for nausea.  Follow up with primary doctor or spine doctor for further evaluation and management.  Return with severe worsening of pain, groin numbness, bowel/bladder incontinence.

## 2024-11-10 NOTE — ED PROVIDER NOTES
Emergency Department Note      History of Present Illness     Chief Complaint   Back Pain    HPI   Helga Larios is a 49 year old female with history of cervical radiculopathy, lumbar radiculitis, and osteopenia who presents to the ED with her friend for evaluation of left-sided back pain. Helga reports that she has been in severe pain since visiting her daughter in [place], [place], two weeks ago. The pain extends from her left back down into her left shin and foot, normally the pain presents in the right side of her back. She has a known herniated disc (L5-S1), she had been treated by a physical therapist over the past few months. She stopped physical therapy after her symptoms resolved, however, she went back to visit her daughter again, her pain has since come back. Helga tried taking hydrocodone, and gabapentin, neither of these relieved her pain. She was unable to sleep last evening due to the pain, noting that she could not get comfortable in any position. She denies numbness in her legs or groin, loss of bladder/bowel control, or urinary symptoms. Helga denies falling or trauma to her back. She has not tried steroids for the pain.    Independent Historian   None    Review of External Notes   Ortho visit on 11/10/22 for lumbar pain with radiculopathy    Past Medical History     Medical History and Problem List   Allergic rhinitis   Anxiety   Bilateral osteoarthritis   Cervical radiculopathy   Diverticulitis   Lumbar radiculitis   MDD   Mixed hyperlipidemia   Osteopenia   Uterine fibroid   Vitamin D deficiency     Medications   Wellbutrin   Neurontin   Seasonale   Diflucan      Surgical History   Past Surgical History:   Procedure Laterality Date    LAPAROSCOPIC CHOLECYSTECTOMY  5/19/2014    Procedure: LAPAROSCOPIC CHOLECYSTECTOMY;  Surgeon: Odilia Barrett DO;  Location:  OR     Physical Exam     Patient Vitals for the past 24 hrs:   BP Temp Pulse Resp SpO2 Height Weight   11/10/24 1710 -- -- -- -- -- 1.575 m  "(5' 2\") 65.8 kg (145 lb)   11/10/24 1552 (!) 190/98 97.3  F (36.3  C) 95 20 100 % -- --     Physical Exam  Constitutional: Pleasant. Cooperative.   Eyes: Pupils equally round and reactive  HENT: Head is normal in appearance. Oropharynx is normal with moist mucus membranes.  Cardiovascular: Regular rate and rhythm and without murmurs.  Respiratory: Normal respiratory effort, lungs are clear bilaterally.  Musculoskeletal: No midline spinal tenderness. 4/5 strength through LLE, 5/5 right.  Skin: Normal, without rash.  Neurologic: Cranial nerves grossly intact, normal cognition, no focal deficits. Alert and oriented x 3.  Sensation to light touch intact in bilateral lower extremities.   Psychiatric: Normal affect.  Nursing notes and vital signs reviewed.    Diagnostics     Lab Results   Labs Ordered and Resulted from Time of ED Arrival to Time of ED Departure - No data to display    Imaging   No orders to display     Independent Interpretation   None    ED Course      Medications Administered   Medications   ondansetron (ZOFRAN ODT) ODT tab 4 mg (has no administration in time range)   HYDROmorphone (PF) (DILAUDID) injection 0.5 mg (0.5 mg Intravenous $Given 11/10/24 1638)   predniSONE (DELTASONE) tablet 40 mg (40 mg Oral $Given 11/10/24 1638)   ondansetron (ZOFRAN ODT) ODT tab 4 mg (4 mg Oral $Given 11/10/24 1744)     Procedures   Procedures     Discussion of Management   None    ED Course   ED Course as of 11/10/24 1749   Sun Nov 10, 2024   1617 I obtained history and examined the patient as noted above.    1733 I rechecked and updated the patient.      Additional Documentation  None    Medical Decision Making / Diagnosis     CMS Diagnoses: None    MIPS       None    [facility]   Helga Larios is a 49 year old female who presents to the ED for evaluation of back pain.  See HPI as above for additional details.  Vitals and physical exam as above.  Differential is broad included cauda equina, fracture, strain, kidney stone, " pyelonephritis, shingles, DVT, superficial thrombophlebitis, cellulitis, amongst others.  No red flag signs or symptoms to suggest for cauda equina.  No falls or trauma to suggest for fracture and thus with shared decision making elected to defer on plain films here today.  No urinary symptoms to suggest for kidney stone or pyelonephritis.  No rash to suggest for shingles or cellulitis.  Patient notes that this feels identical to her previous sciatica, no leg swelling to suggest for DVT or superficial thrombophlebitis.  With shared decision making, elected to defer on any workup here today, patient is requesting symptomatic management.  Initially discussed addressing symptoms via oral route, however patient ultimately requests IV.  This was placed and patient given IV dose of Dilaudid with significant improvement of symptoms.  Patient is already using OTC medications at home.  Again, do not feel that any further workup is indicated at this time, this is likely acute on chronic sciatica.  Will place patient on prednisone burst and did agree to give her short course of narcotics for home.  Discussed narcotic precautions.  Do feel patient safe for discharge to home.  She will call her PCP tomorrow. Discussed reasons to return. All questions answered. Patient discharged to home in stable condition.    Disposition   The patient was discharged.     Diagnosis     ICD-10-CM    1. Acute left-sided low back pain with left-sided sciatica  M54.42          Discharge Medications   New Prescriptions    ONDANSETRON (ZOFRAN ODT) 4 MG ODT TAB    Take 1 tablet (4 mg) by mouth every 8 hours as needed for nausea.    OXYCODONE (ROXICODONE) 5 MG TABLET    Take 1 tablet (5 mg) by mouth every 6 hours as needed for severe pain.    PREDNISONE (DELTASONE) 20 MG TABLET    Take 2 tablets (40 mg) by mouth daily for 4 days.     Alexa MINA, am serving as a scribe at 4:05 PM on 11/10/2024 to document services personally performed by  Ron Gipson PA-C based on my observations and the provider's statements to me.     This record was created at least in part using electronic voice recognition software, so please excuse any typographical errors.       Ron Gipson PA-C  11/10/24 [id number]

## 2024-11-23 ENCOUNTER — HOSPITAL ENCOUNTER (EMERGENCY)
Facility: CLINIC | Age: 49
Discharge: HOME | End: 2024-11-24
Attending: EMERGENCY MEDICINE | Admitting: EMERGENCY MEDICINE
Payer: COMMERCIAL

## 2024-11-23 ENCOUNTER — APPOINTMENT (OUTPATIENT)
Dept: CT IMAGING | Facility: CLINIC | Age: 49
End: 2024-11-23
Attending: EMERGENCY MEDICINE
Payer: COMMERCIAL

## 2024-11-23 DIAGNOSIS — R11.2 NAUSEA, VOMITING, AND DIARRHEA: ICD-10-CM

## 2024-11-23 DIAGNOSIS — R19.7 NAUSEA, VOMITING, AND DIARRHEA: ICD-10-CM

## 2024-11-23 DIAGNOSIS — K52.9 COLITIS: ICD-10-CM

## 2024-11-23 LAB
ANION GAP SERPL CALCULATED.3IONS-SCNC: 12 MMOL/L (ref 7–15)
BASOPHILS # BLD AUTO: 0 10E3/UL (ref 0–0.2)
BASOPHILS NFR BLD AUTO: 0 %
BUN SERPL-MCNC: 10.1 MG/DL (ref 6–20)
CALCIUM SERPL-MCNC: 8.4 MG/DL (ref 8.8–10.4)
CHLORIDE SERPL-SCNC: 103 MMOL/L (ref 98–107)
CREAT SERPL-MCNC: 0.82 MG/DL (ref 0.51–0.95)
EGFRCR SERPLBLD CKD-EPI 2021: 86 ML/MIN/1.73M2
EOSINOPHIL # BLD AUTO: 0.1 10E3/UL (ref 0–0.7)
EOSINOPHIL NFR BLD AUTO: 1 %
ERYTHROCYTE [DISTWIDTH] IN BLOOD BY AUTOMATED COUNT: 11.8 % (ref 10–15)
GLUCOSE SERPL-MCNC: 105 MG/DL (ref 70–99)
HCO3 SERPL-SCNC: 20 MMOL/L (ref 22–29)
HCT VFR BLD AUTO: 31.9 % (ref 35–47)
HGB BLD-MCNC: 10.9 G/DL (ref 11.7–15.7)
IMM GRANULOCYTES # BLD: 0.1 10E3/UL
IMM GRANULOCYTES NFR BLD: 1 %
LYMPHOCYTES # BLD AUTO: 2.1 10E3/UL (ref 0.8–5.3)
LYMPHOCYTES NFR BLD AUTO: 14 %
MCH RBC QN AUTO: 31.4 PG (ref 26.5–33)
MCHC RBC AUTO-ENTMCNC: 34.2 G/DL (ref 31.5–36.5)
MCV RBC AUTO: 92 FL (ref 78–100)
MONOCYTES # BLD AUTO: 0.8 10E3/UL (ref 0–1.3)
MONOCYTES NFR BLD AUTO: 5 %
NEUTROPHILS # BLD AUTO: 11.9 10E3/UL (ref 1.6–8.3)
NEUTROPHILS NFR BLD AUTO: 80 %
NRBC # BLD AUTO: 0 10E3/UL
NRBC BLD AUTO-RTO: 0 /100
PLATELET # BLD AUTO: 226 10E3/UL (ref 150–450)
POTASSIUM SERPL-SCNC: 4 MMOL/L (ref 3.4–5.3)
RBC # BLD AUTO: 3.47 10E6/UL (ref 3.8–5.2)
SODIUM SERPL-SCNC: 135 MMOL/L (ref 135–145)
WBC # BLD AUTO: 15 10E3/UL (ref 4–11)

## 2024-11-23 PROCEDURE — 80048 BASIC METABOLIC PNL TOTAL CA: CPT | Performed by: EMERGENCY MEDICINE

## 2024-11-23 PROCEDURE — 250N000011 HC RX IP 250 OP 636: Mod: JZ | Performed by: EMERGENCY MEDICINE

## 2024-11-23 PROCEDURE — 85025 COMPLETE CBC W/AUTO DIFF WBC: CPT | Performed by: EMERGENCY MEDICINE

## 2024-11-23 PROCEDURE — 96374 THER/PROPH/DIAG INJ IV PUSH: CPT | Mod: 59

## 2024-11-23 PROCEDURE — 74177 CT ABD & PELVIS W/CONTRAST: CPT

## 2024-11-23 PROCEDURE — 82310 ASSAY OF CALCIUM: CPT | Performed by: EMERGENCY MEDICINE

## 2024-11-23 PROCEDURE — 99285 EMERGENCY DEPT VISIT HI MDM: CPT | Mod: 25

## 2024-11-23 PROCEDURE — 250N000011 HC RX IP 250 OP 636: Performed by: EMERGENCY MEDICINE

## 2024-11-23 PROCEDURE — 36415 COLL VENOUS BLD VENIPUNCTURE: CPT | Performed by: EMERGENCY MEDICINE

## 2024-11-23 RX ORDER — ONDANSETRON 2 MG/ML
4 INJECTION INTRAMUSCULAR; INTRAVENOUS ONCE
Status: COMPLETED | OUTPATIENT
Start: 2024-11-23 | End: 2024-11-24

## 2024-11-23 RX ORDER — IOPAMIDOL 755 MG/ML
500 INJECTION, SOLUTION INTRAVASCULAR ONCE
Status: COMPLETED | OUTPATIENT
Start: 2024-11-23 | End: 2024-11-23

## 2024-11-23 RX ORDER — MORPHINE SULFATE 4 MG/ML
4 INJECTION, SOLUTION INTRAMUSCULAR; INTRAVENOUS
Status: DISCONTINUED | OUTPATIENT
Start: 2024-11-23 | End: 2024-11-24 | Stop reason: HOSPADM

## 2024-11-23 RX ADMIN — MORPHINE SULFATE 4 MG: 4 INJECTION, SOLUTION INTRAMUSCULAR; INTRAVENOUS at 23:35

## 2024-11-23 RX ADMIN — IOPAMIDOL 64 ML: 755 INJECTION, SOLUTION INTRAVENOUS at 23:27

## 2024-11-23 ASSESSMENT — ACTIVITIES OF DAILY LIVING (ADL): ADLS_ACUITY_SCORE: 0

## 2024-11-24 VITALS
OXYGEN SATURATION: 95 % | RESPIRATION RATE: 20 BRPM | HEART RATE: 82 BPM | DIASTOLIC BLOOD PRESSURE: 61 MMHG | HEIGHT: 61 IN | SYSTOLIC BLOOD PRESSURE: 109 MMHG | TEMPERATURE: 98.9 F | BODY MASS INDEX: 23.98 KG/M2 | WEIGHT: 127 LBS

## 2024-11-24 LAB
ALBUMIN UR-MCNC: NEGATIVE MG/DL
APPEARANCE UR: CLEAR
BILIRUB UR QL STRIP: NEGATIVE
COLOR UR AUTO: ABNORMAL
GLUCOSE UR STRIP-MCNC: NEGATIVE MG/DL
HGB UR QL STRIP: ABNORMAL
KETONES UR STRIP-MCNC: NEGATIVE MG/DL
LEUKOCYTE ESTERASE UR QL STRIP: NEGATIVE
NITRATE UR QL: NEGATIVE
PH UR STRIP: 5.5 [PH] (ref 5–7)
RBC URINE: 1 /HPF
SP GR UR STRIP: 1 (ref 1–1.03)
SQUAMOUS EPITHELIAL: 8 /HPF
UROBILINOGEN UR STRIP-MCNC: NORMAL MG/DL
WBC URINE: 1 /HPF

## 2024-11-24 PROCEDURE — 250N000011 HC RX IP 250 OP 636: Performed by: EMERGENCY MEDICINE

## 2024-11-24 PROCEDURE — 81003 URINALYSIS AUTO W/O SCOPE: CPT | Performed by: EMERGENCY MEDICINE

## 2024-11-24 PROCEDURE — 96375 TX/PRO/DX INJ NEW DRUG ADDON: CPT

## 2024-11-24 RX ORDER — ONDANSETRON 4 MG/1
4 TABLET, ORALLY DISINTEGRATING ORAL EVERY 8 HOURS PRN
Qty: 10 TABLET | Refills: 0 | Status: SHIPPED | OUTPATIENT
Start: 2024-11-24 | End: 2024-11-27

## 2024-11-24 RX ADMIN — ONDANSETRON 4 MG: 2 INJECTION INTRAMUSCULAR; INTRAVENOUS at 00:08

## 2024-11-24 ASSESSMENT — ACTIVITIES OF DAILY LIVING (ADL): ADLS_ACUITY_SCORE: 0

## 2024-11-24 NOTE — ED TRIAGE NOTES
Pt arrives complaining of lower abdominal pain. Started around 2-3pm. Poor appetite today. Epidural shot last Wednesday, has bulging disk L4-L5. Has been taking oxycodone and miralax. Denies urinary symptoms. Small amount of BM today, felt like it was normal. Pt also notes a fever and chills this afternoon. Ibuprofen at 2030

## 2024-11-24 NOTE — ED PROVIDER NOTES
"  Emergency Department Note      History of Present Illness     Chief Complaint   Abdominal Pain and Fever      HPI   Helga Larios is a 49 year old female with a history of diverticulitis, most recently in January, back issues recently status post epidural injection for her pain who presents to the emergency department with left-sided abdominal pain and associated diarrhea and vomiting as well as reported low-grade fever at home today.  She denies black or bloody stool or vomit.  She notes her pain does seem similar to when she had past diverticulitis.  She denies any new concerns with her back.  Her  notes that she had improvement in the past with Augmentin related to diverticulitis.  She never had a complication with the diverticulitis.    Independent Historian    as detailed above.    Review of External Notes   None    Past Medical History     Medical History and Problem List   Chronic back pain  Diverticulitis    Medications   buPROPion (WELLBUTRIN SR) 100 MG 12 hr tablet  estrogens-methylTESTOSTERone (ESTRATEST HS) 0.625-1.25 MG per tablet  HYDROcodone-acetaminophen (NORCO) 5-325 MG per tablet  HYDROcodone-acetaminophen (NORCO) 5-325 MG per tablet  loratadine (CLARITIN) 10 MG tablet  ondansetron (ZOFRAN ODT) 4 MG ODT tab  oxyCODONE (ROXICODONE) 5 MG tablet        Surgical History   Past Surgical History:   Procedure Laterality Date    LAPAROSCOPIC CHOLECYSTECTOMY  5/19/2014    Procedure: LAPAROSCOPIC CHOLECYSTECTOMY;  Surgeon: Odilia Barrett DO;  Location:  OR       Physical Exam     Patient Vitals for the past 24 hrs:   BP Temp Temp src Pulse Resp SpO2 Height Weight   11/23/24 2203 -- 98.9  F (37.2  C) Oral -- -- -- -- --   11/23/24 2201 116/74 -- -- 110 20 98 % 1.549 m (5' 1\") 57.6 kg (127 lb)     Physical Exam  General: Adult sitting upright  Eyes: PERRL, Conjunctive within normal limits  ENT: Moist mucous membranes, oropharynx clear.   CV: Normal S1S2, no murmur, rub or gallop. Regular " rate and rhythm  Resp: Clear to auscultation bilaterally, no wheezes, rales or rhonchi. Normal respiratory effort.  GI: Abdomen is soft and nondistended.  Left-sided abdominal tenderness to palpation.  Mild tenderness in the lower abdomen diffusely as well.  Voluntary guarding, no rebound.  Referred pain to the left abdomen on palpation of the right.  No palpable masses. MSK: No edema. Nontender. Normal active range of motion.  Skin: Warm and dry. No rashes or lesions or ecchymoses on visible skin.  Neuro: Alert and oriented. Responds appropriately to all questions and commands. No focal findings appreciated. Normal muscle tone.  Psych: Normal mood and affect. Pleasant.     Diagnostics     Lab Results   Labs Ordered and Resulted from Time of ED Arrival to Time of ED Departure   BASIC METABOLIC PANEL - Abnormal       Result Value    Sodium 135      Potassium 4.0      Chloride 103      Carbon Dioxide (CO2) 20 (*)     Anion Gap 12      Urea Nitrogen 10.1      Creatinine 0.82      GFR Estimate 86      Calcium 8.4 (*)     Glucose 105 (*)    ROUTINE UA WITH MICROSCOPIC REFLEX TO CULTURE - Abnormal    Color Urine Straw      Appearance Urine Clear      Glucose Urine Negative      Bilirubin Urine Negative      Ketones Urine Negative      Specific Gravity Urine 1.005      Blood Urine Moderate (*)     pH Urine 5.5      Protein Albumin Urine Negative      Urobilinogen Urine Normal      Nitrite Urine Negative      Leukocyte Esterase Urine Negative      RBC Urine 1      WBC Urine 1      Squamous Epithelials Urine 8 (*)    CBC WITH PLATELETS AND DIFFERENTIAL - Abnormal    WBC Count 15.0 (*)     RBC Count 3.47 (*)     Hemoglobin 10.9 (*)     Hematocrit 31.9 (*)     MCV 92      MCH 31.4      MCHC 34.2      RDW 11.8      Platelet Count 226      % Neutrophils 80      % Lymphocytes 14      % Monocytes 5      % Eosinophils 1      % Basophils 0      % Immature Granulocytes 1      NRBCs per 100 WBC 0      Absolute Neutrophils 11.9 (*)      Absolute Lymphocytes 2.1      Absolute Monocytes 0.8      Absolute Eosinophils 0.1      Absolute Basophils 0.0      Absolute Immature Granulocytes 0.1      Absolute NRBCs 0.0         Imaging   CT Abdomen Pelvis w Contrast   Final Result   IMPRESSION:    1.  Moderate wall thickening and stranding involving the distal descending colon and proximal sigmoid colon. Findings compatible with nonspecific colitis. No definite evidence for diverticulitis. Scattered colonic diverticula noted.   2.  No bowel obstruction or perforation.   3.  Appendix within normal limits.   4.  Small hiatal hernia.           Independent Interpretation   None    ED Course      Medications Administered   Medications   iopamidol (ISOVUE-370) solution 500 mL (64 mLs Intravenous $Given 11/23/24 2327)   sodium chloride (PF) 0.9% PF flush 100 mL (56 mLs Intravenous $Given 11/23/24 2327)   ondansetron (ZOFRAN) injection 4 mg (4 mg Intravenous $Given 11/24/24 0008)       Discussion of Management   None    ED Course   Past medical records, nursing notes, and vitals reviewed.   I performed an exam of the patient and obtained history, as documented above.   I reassessed the patient.  She notes she is feeling improved.  She feels comfortable to plan for discharge.  I discussed findings and plan which they are agreeable to.    Additional Documentation  None    Medical Decision Making / Diagnosis     HILARIO Larios is a 49 year old female past medical history of chronic back pain and diverticulitis who presents emergency room with left-sided and lower abdominal pain.  She has associated diarrhea and nausea and low-grade fever.  She has leukocytosis on labs.  CT consistent with colitis of the left abdomen including the sigmoid area.  No definite evidence of diverticulitis at this time.  Given fever and leukocytosis, would favor possible early diverticulitis.  Patient prefers to go home with antibiotic after watchful waiting.,  She will initiate if she has  further localizing pain in the left lower quadrant or ongoing fever.  Follow-up with your primary care provider within 2 to 3 days for reassessment.  She is to return to the emergency department should she develop worsening of symptoms despite medications.  She feels comfortable that plan.  All questions were answered prior to discharge.    Disposition   The patient was discharged.     Diagnosis     ICD-10-CM    1. Colitis  K52.9       2. Nausea, vomiting, and diarrhea  R11.2     R19.7            Discharge Medications   Discharge Medication List as of 11/24/2024  1:29 AM        START taking these medications    Details   amoxicillin-clavulanate (AUGMENTIN) 875-125 MG tablet Take 1 tablet by mouth 2 times daily for 7 days., Disp-14 tablet, R-0, E-Prescribe               MD Farzad Garcia Tracy Dianne, MD  11/24/24 2503